# Patient Record
(demographics unavailable — no encounter records)

---

## 2025-06-08 NOTE — PHYSICAL EXAM
[de-identified] : Left knee exam  Skin: Clean, dry, intact Inspection: No obvious malalignment, no masses, no swelling, no effusion Pulses: 2+ DP/PT pulses ROM: 0-135 degrees of flexion. No pain with deep knee flexion/extension. Tenderness: No MJLT. No LJLT. No pain over the patella facets. No pain to the quadriceps tendon. No pain to the patella tendon. +posterior knee tenderness. +pain over the popliteal fossa.  Stability: Stable to varus, valgus. Negative Lachman testing. Negative anterior drawer, negative posterior drawer. Strength: 5/5 Q/H/TA/GS/EHL, without atrophy Neuro: In tact to light touch throughout, DTR's normal Additional tests: Negative McMurrays test, Negative patellar grind test. [de-identified] : Outside Images below were independently interpreted by Dr. Nascimento:  4 views of the left knee were obtained, 04/28/2025, that show no acute fracture or dislocation. There is minimal medial, no lateral and mild patellofemoral degenerative changes seen. There is no significant malalignment. No significant other obvious osseous abnormality, otherwise unremarkable.

## 2025-06-08 NOTE — HISTORY OF PRESENT ILLNESS
[de-identified] : 78-year-old male presents with left knee pain x 2 months. No injury or trauma reported. He was seen by PMD, X-ray was obtained and was given a steroid injection on 05/01/2025 with only a few weeks of relief. He states that pain has become worse and is now constant, worse with walking. Patient localizes the pain to the posterior aspect of the knee. Patient is taking Diclofenac/ Motrin with some relief. Patient denies prior knee sx.    The patient's past medical history, past surgical history, medications and allergies were reviewed by me today with the patient and documented accordingly. In addition, the patient's family and social history, which were noncontributory to this visit were reviewed also.

## 2025-06-08 NOTE — HISTORY OF PRESENT ILLNESS
[de-identified] : 78-year-old male presents with left knee pain x 2 months. No injury or trauma reported. He was seen by PMD, X-ray was obtained and was given a steroid injection on 05/01/2025 with only a few weeks of relief. He states that pain has become worse and is now constant, worse with walking. Patient localizes the pain to the posterior aspect of the knee. Patient is taking Diclofenac/ Motrin with some relief. Patient denies prior knee sx.    The patient's past medical history, past surgical history, medications and allergies were reviewed by me today with the patient and documented accordingly. In addition, the patient's family and social history, which were noncontributory to this visit were reviewed also.

## 2025-06-08 NOTE — DISCUSSION/SUMMARY
[de-identified] : 77 y/o male with left knee pain.   Patient presents with degenerative left knee pain. Clinically his knee joint has good ROM and stability and mild pain over the popliteal fossa. Presentation is consistent with early degenerative change and arthrosis to the knee. Described that this is likely due to overuse, and age-related "wear and tear". Pain may be related to breakdown of the chondral surfaces, cartilage, or ligaments of the knee. Radiographs show minimal degenerative changes seen. Nonconcerning discomfort given age of patient and relatively benign exam/minor symptoms. Would continue to recommend observation.  Recommendation: Conservative care & observation; including rest/activity avoidance until less symptomatic with subsequent gradual return to full low impact activity as tolerated. Patient may also use OTC NSAID's, or acetaminophen as tolerated, with application of ice/heat to the area 2-3x daily for 20 minutes after periods of activity.   Follow-up as needed if pain persists for further evaluation and treatment

## 2025-06-08 NOTE — DISCUSSION/SUMMARY
[de-identified] : 77 y/o male with left knee pain.   Patient presents with degenerative left knee pain. Clinically his knee joint has good ROM and stability and mild pain over the popliteal fossa. Presentation is consistent with early degenerative change and arthrosis to the knee. Described that this is likely due to overuse, and age-related "wear and tear". Pain may be related to breakdown of the chondral surfaces, cartilage, or ligaments of the knee. Radiographs show minimal degenerative changes seen. Nonconcerning discomfort given age of patient and relatively benign exam/minor symptoms. Would continue to recommend observation.  Recommendation: Conservative care & observation; including rest/activity avoidance until less symptomatic with subsequent gradual return to full low impact activity as tolerated. Patient may also use OTC NSAID's, or acetaminophen as tolerated, with application of ice/heat to the area 2-3x daily for 20 minutes after periods of activity.   Follow-up as needed if pain persists for further evaluation and treatment

## 2025-06-08 NOTE — PHYSICAL EXAM
[de-identified] : Left knee exam  Skin: Clean, dry, intact Inspection: No obvious malalignment, no masses, no swelling, no effusion Pulses: 2+ DP/PT pulses ROM: 0-135 degrees of flexion. No pain with deep knee flexion/extension. Tenderness: No MJLT. No LJLT. No pain over the patella facets. No pain to the quadriceps tendon. No pain to the patella tendon. +posterior knee tenderness. +pain over the popliteal fossa.  Stability: Stable to varus, valgus. Negative Lachman testing. Negative anterior drawer, negative posterior drawer. Strength: 5/5 Q/H/TA/GS/EHL, without atrophy Neuro: In tact to light touch throughout, DTR's normal Additional tests: Negative McMurrays test, Negative patellar grind test. [de-identified] : Outside Images below were independently interpreted by Dr. Nascimento:  4 views of the left knee were obtained, 04/28/2025, that show no acute fracture or dislocation. There is minimal medial, no lateral and mild patellofemoral degenerative changes seen. There is no significant malalignment. No significant other obvious osseous abnormality, otherwise unremarkable.

## 2025-06-08 NOTE — ADDENDUM
[FreeTextEntry1] : This note was written by Vicky Carreon on 06/02/2025 acting solely as a scribe for Dr. Asa Nascimento.   All medical record entries made by the Scribe were at my, Dr. Asa Nascimento, direction and personally dictated by me on 06/02/2025. I have personally reviewed the chart and agree that the record accurately reflects my personal performance of the history, physical exam, assessment and plan.

## 2025-07-02 NOTE — PHYSICAL EXAM
[Antalgic] : antalgic [Winters's Sign] : negative Winters's sign [Pronator Drift] : negative pronator drift [SLR] : positive straight leg raise [de-identified] : 3-4 out of 5 right quadriceps with a slightly decreased patellar reflex on the right decree sensation on the right.  Slightly antalgic to the left.  5 out of 5 motor strength, sensation is intact and symmetrical full range of motion flexion extension and rotation, no palpatory tenderness full range of motion of hips knees shoulders and elbows (all four extremities), no atrophy, positive left straight leg raise, no pathological reflexes, no swelling, normal ambulation, no apparent distress skin is intact, no palpable lymph nodes, no upper or lower extremity instability, alert and oriented x3 and normal mood. Normal finger-to nose test.  Minimal percussion tenderness lumbar spine. [de-identified] : MRI Lumbar 6/23/25  ([facility])  IMPRESSION: Moderate to severe L3 compression fracture that is most likely either acute or subacute given the presence of marked marrow edema. No retropulsion.   Multilevel degenerative disc disease, facet arthropathy and ligamentum flavum hypertrophy with resultant spinal canal and neuroforaminal stenosis, as described. The spinal canal stenosis is worst at L3/L4 and L4/L5 where there is compression of the nerve roots of the cauda equina.   Mild dextroscoliosis of the lumbar spine.            MODERATE TO SEVERE L3 COMPRESSION FRACTURE THAT IS MOST LIKELY EITHER ACUTE OR SUBACUTE GIVEN THE PRESENCE OF MARKED MARROW EDEMA. NO RETROPULSION.   MULTILEVEL DEGENERATIVE DISC DISEASE, FACET ARTHROPATHY AND LIGAMENTUM FLAVUM HYPERTROPHY WITH RESULTANT SPINAL CANAL AND NEUROFORAMINAL STENOSIS, AS DESCRIBED. THE SPINAL CANAL STENOSIS IS WORST AT L3/L4 AND L4/L5 WHERE THERE IS COMPRESSION OF THE NERVE ROOTS OF THE CAUDA EQUINA.   IMPORTANT FINDING. THIS REPORT WILL BE FLAGGED (!) IN EPIC.     Clinical Indication: Pain   Technique: Multi-planar, multi-sequential MR imaging of the lumbar spine was performed without intravenous contrast.    Comparison: None   Findings:  For the purposes of this report, the vertebra on series 6 image 1 will be considered L1.   There is mild dextroscoliosis of the lumbar spine. There is normal lordotic curvature. Alignment is normal.   There is a moderate to severe L3 compression fracture. There is severe marrow edema, suggesting that the fracture is either acute or subacute. There is no retropulsion.   At L5/S1, severe intervertebral disc space narrowing with subchondral cystic changes in the endplates and type II Modic changes are appreciated. The remainder of the lumbar spine demonstrates mild intervertebral disc space narrowing. There is minimal reactive endplate edema at several levels.   The conus medullaris is at the level of L1/L2.   L1-L2: There is a minimal disc bulge. There is no central canal, subarticular zone or neuroforaminal stenosis.   L2-L3: A disc bulge, mild to moderate facet arthropathy and ligamentum flavum hypertrophy are visualized. There is mild central canal, mild bilateral subarticular zone and mild right neuroforaminal stenosis.   L3-L4: A disc bulge, slight posterior endplate osteophyte formation, moderate facet arthropathy and ligamentum flavum hypertrophy are visualized. There is moderate to severe central canal and moderate to severe bilateral subarticular zone stenosis. The nerve roots of the cauda equina are compressed. There is mild bilateral neuroforaminal stenosis.   L4-L5: A disc bulge, superimposed central disc protrusion, posterior endplate osteophyte formation, moderate facet arthropathy and ligamentum flavum hypertrophy are visualized. There is severe central canal and severe bilateral subarticular zone stenosis. There is severe compression of the nerve roots of the cauda equina. There is also mild left and mild-to-moderate right neuroforaminal stenosis.   L5-S1: A disc bulge, posterior endplate osteophyte formation and mild to moderate facet arthropathy are appreciated. There is no central canal stenosis. Minimal right subarticular zone stenosis is appreciated. There is no significant neuroforaminal stenosis.   There are degenerative changes in the bilateral sacroiliac joints.

## 2025-07-02 NOTE — DISCUSSION/SUMMARY
[Medication Risks Reviewed] : Medication risks reviewed [Surgical risks reviewed] : Surgical risks reviewed [de-identified] : L3 compression fracture-approximately 4 to 6 weeks old. Lumbar degenerative disc disease. L3-4 and L4-5 stenosis. Discussed all options. He has failed all conservative treatment including physical therapy for weeks as well as epidural injections and medications and he is getting worse with weakness on the right side. Discussed surgical options, laminectomy at L3-5, which patient agrees and plans for 1 month from now. F/U 1 week before surgery date for X Rays to assess fracture. Risks of surgery include infection, dural tear, nerve root injury, reherniation, future leg pain, future back pain, retained fragment, hematoma, urinary retention, worsening leg symptoms, foot drop, anesthetic risks, blood transfusion risks, positioning pain, visceral vascular injury, myocardial infarction, deep vein thrombosis, pulmonary embolus, stroke, unplanned return to OR, and death. All risks were explained not exclusive to the ones mentioned alternatives were discussed and all questions were answered the patient agrees and understands the above and is in complete agreement with the plan. Surgery will Dante be scheduled on August 19 as a family function prior to that return prior for x-ray examination to assess fracture healing prior to surgery. All options discussed including rest, medicine, home exercise, acupuncture, Chiropractic care, Physical Therapy, Pain management, and last resort surgery. All questions were answered, all alternatives discussed and the patient is in complete agreement with the treatment plan which the patient contributed to and discussed with me through the shared decision making process. Follow-up appointment as instructed. Any issues and the patient will call or come in sooner.  Family agrees with the plan and translated the visit.

## 2025-07-02 NOTE — HISTORY OF PRESENT ILLNESS
[Worsening] : worsening [de-identified] : 78 year old Yoruba speaking male presents for evaluation of left leg pain x several months, has been worsening.  Denies injury. The pain radiates from the left lower back down the LLE to the posterior knee, with numbness/tingling of the lower leg. Most of the pain is at the posterior knee.  He had seen Dr. Nascimento for the knee pain and had XRs done which revealed mild arthritis. He had a cortisone injection in May with his PCP which provided mild relief. Pain is worse with walking.  Denies weakness. Takes aleve for the pain.  Has not tried PT or chiropractic care. Denies NERY. Has MRI Lumbar.  Saw Dr. Campos who recommended surgery, presents for 2nd opinion.  He attempted epidural injections and physical therapy in the last several months that made his pain worse and the injection only helped for 3 days.  He is also tried different medications.  He is miserable and worsening and having difficulties ambulating. PMHx: HLD  No fever chills sweats nausea vomiting no bowel or bladder dysfunction, no recent weight loss or gain no night pain. This history is in addition to the intake form that I personally reviewed.

## 2025-07-02 NOTE — ADDENDUM
[FreeTextEntry1] : This note was written by Baudilio Hurley on 07/02/2025 acting as scribe for Dr. Carl Post M.D. I, Carl Post MD, have read and attest that all the information, medical decision making and discharge instructions within are true and accurate.